# Patient Record
Sex: FEMALE | Race: WHITE | URBAN - METROPOLITAN AREA
[De-identification: names, ages, dates, MRNs, and addresses within clinical notes are randomized per-mention and may not be internally consistent; named-entity substitution may affect disease eponyms.]

---

## 2022-05-04 ENCOUNTER — OFFICE VISIT (OUTPATIENT)
Dept: URBAN - METROPOLITAN AREA CLINIC 44 | Facility: CLINIC | Age: 87
End: 2022-05-04
Payer: MEDICARE

## 2022-05-04 DIAGNOSIS — H40.1132 PRIMARY OPEN-ANGLE GLAUCOMA, BILATERAL, MODERATE STAGE: Primary | ICD-10-CM

## 2022-05-04 PROCEDURE — 92133 CPTRZD OPH DX IMG PST SGM ON: CPT | Performed by: OPHTHALMOLOGY

## 2022-05-04 PROCEDURE — 99205 OFFICE O/P NEW HI 60 MIN: CPT | Performed by: OPHTHALMOLOGY

## 2022-05-04 PROCEDURE — 92083 EXTENDED VISUAL FIELD XM: CPT | Performed by: OPHTHALMOLOGY

## 2022-05-04 PROCEDURE — 92250 FUNDUS PHOTOGRAPHY W/I&R: CPT | Performed by: OPHTHALMOLOGY

## 2022-05-04 PROCEDURE — 76514 ECHO EXAM OF EYE THICKNESS: CPT | Performed by: OPHTHALMOLOGY

## 2022-05-04 RX ORDER — DORZOLAMIDE HCL 20 MG/ML
2 % SOLUTION/ DROPS OPHTHALMIC
Qty: 15 | Refills: 3 | Status: ACTIVE
Start: 2022-05-04

## 2022-05-04 RX ORDER — OLOPATADINE HYDROCHLORIDE 1 MG/ML
0.1 % SOLUTION OPHTHALMIC
Qty: 15 | Refills: 3 | Status: ACTIVE
Start: 2022-05-04

## 2022-05-04 RX ORDER — TIMOLOL MALEATE 5 MG/ML
0.5 % SOLUTION/ DROPS OPHTHALMIC
Qty: 10 | Refills: 3 | Status: ACTIVE
Start: 2022-05-04

## 2022-05-04 RX ORDER — LATANOPROST 50 UG/ML
0.005 % SOLUTION OPHTHALMIC
Qty: 7.5 | Refills: 3 | Status: ACTIVE
Start: 2022-05-04

## 2022-05-04 ASSESSMENT — INTRAOCULAR PRESSURE
OS: 18
OD: 36

## 2022-05-04 NOTE — IMPRESSION/PLAN
Impression: Primary open-angle glaucoma, bilateral, moderate stage: O62.9959. Plan: PT HAS GLAUCOMA OD>OS     GONIO :   SS OU (05/04/2022)      PACHS:  
PT WAS REFERRED BY [facility] [facility] THE PT REPORTS DIFFICULTY WITH SHORT TERM MEMORY AND PRESENTS WITH HER GRANDAUGHTER
PT DENIES FAMILY HX OF GLAUCOMA
PT DENIES SULFA ALLERGY 
PT DENIES LUNG DZ 
TARGET IOP LOW TEENS OR LESS 
RECOMMEND : 
1. CONTINUE LATANAPROST OU QPM
2. RAISE TO  DORZOLAMIDE OD TID (PRESENTED USING QDAY, RAISE ON 5/4/22) 3. DISCUSSED EYEDROP TECHNIQUE WITH PT AND HER GRANDAUGHTER 4. ADD TIMOLOL 1/2 OD QAM (START 5/4/22) 5. THE PATIENT DEFERS ANY GLAUCOMA SURGERY TO THE RIGHT EYE 5/4/22 6.  PRESCRIBE PATADAY OD QDAY

## 2022-07-19 ENCOUNTER — OFFICE VISIT (OUTPATIENT)
Dept: URBAN - METROPOLITAN AREA CLINIC 44 | Facility: CLINIC | Age: 87
End: 2022-07-19
Payer: MEDICARE

## 2022-07-19 DIAGNOSIS — H43.813 VITREOUS DEGENERATION, BILATERAL: ICD-10-CM

## 2022-07-19 DIAGNOSIS — H40.1132 PRIMARY OPEN-ANGLE GLAUCOMA, BILATERAL, MODERATE STAGE: ICD-10-CM

## 2022-07-19 DIAGNOSIS — H04.123 TEAR FILM INSUFFICIENCY OF BILATERAL LACRIMAL GLANDS: Primary | ICD-10-CM

## 2022-07-19 DIAGNOSIS — H53.19 OTHER SUBJECTIVE VISUAL DISTURBANCES: ICD-10-CM

## 2022-07-19 DIAGNOSIS — Z96.1 PRESENCE OF INTRAOCULAR LENS: ICD-10-CM

## 2022-07-19 PROCEDURE — 99204 OFFICE O/P NEW MOD 45 MIN: CPT | Performed by: OPTOMETRIST

## 2022-07-19 PROCEDURE — 92134 CPTRZ OPH DX IMG PST SGM RTA: CPT | Performed by: OPTOMETRIST

## 2022-07-19 ASSESSMENT — VISUAL ACUITY
OS: 20/25
OD: 20/30

## 2022-07-19 ASSESSMENT — INTRAOCULAR PRESSURE
OS: 20
OD: 23

## 2022-07-19 ASSESSMENT — KERATOMETRY
OD: 44.88
OS: 44.88

## 2022-07-19 NOTE — IMPRESSION/PLAN
Impression: Primary open-angle glaucoma, bilateral, moderate stage: Y65.0051. Plan: IOP suboptimal.  Continue current meds. Follow up with Dr. Brandon Rosado next week as scheduled.

## 2022-07-19 NOTE — IMPRESSION/PLAN
Impression: Other subjective visual disturbances: H53.19. Right. Plan: Patient has noticed a line in right eye for the past two months. It is bothersome, but does not appear to affect visual acuity. MAC OCT wnl. Unclear what is causing symptoms. Recommend see Retina for further evaluation.

## 2022-07-27 ENCOUNTER — OFFICE VISIT (OUTPATIENT)
Dept: URBAN - METROPOLITAN AREA CLINIC 44 | Facility: CLINIC | Age: 87
End: 2022-07-27
Payer: MEDICARE

## 2022-07-27 DIAGNOSIS — H40.1132 PRIMARY OPEN-ANGLE GLAUCOMA, BILATERAL, MODERATE STAGE: Primary | ICD-10-CM

## 2022-07-27 PROCEDURE — 99213 OFFICE O/P EST LOW 20 MIN: CPT | Performed by: OPHTHALMOLOGY

## 2022-07-27 ASSESSMENT — INTRAOCULAR PRESSURE
OD: 25
OS: 25

## 2022-07-27 NOTE — IMPRESSION/PLAN
Impression: Primary open-angle glaucoma, bilateral, moderate stage: R00.4228. Plan: PT HAS GLAUCOMA OD>OS     GONIO :   SS OU (05/04/2022) PT WAS REFERRED BY [facility] - 
THE PT REPORTS DIFFICULTY WITH SHORT TERM MEMORY AND PRESENTS WITH HER GRANDAUGHTER
PT DENIES FAMILY HX OF GLAUCOMA
PT DENIES SULFA ALLERGY 
PT DENIES LUNG DZ 
TARGET IOP LOW TEENS OR LESS 
RECOMMEND : 
1. CONTINUE LATANAPROST OU QPM
2. CONTINUE DORZOLAMIDE OD TID (PRESENTED USING QDAY, RAISED ON 5/4/22) 3. CONTINUE TIMOLOL 1/2 OD QAM (STARTED 5/4/22) 4. DISCUSSED EYEDROP TECHNIQUE WITH PT AND HER GRANDAUGHTER
5.  THE PATIENT DEFERS ANY GLAUCOMA SURGERY TO THE RIGHT EYE 5/4/22 - 7/27/22
6  F/U WITH DR ANTONIO 3-6 MONTHS OR SOONER

## 2022-09-12 ENCOUNTER — OFFICE VISIT (OUTPATIENT)
Dept: URBAN - METROPOLITAN AREA CLINIC 44 | Facility: CLINIC | Age: 87
End: 2022-09-12
Payer: MEDICARE

## 2022-09-12 DIAGNOSIS — H43.393 OTHER VITREOUS OPACITIES, BILATERAL: ICD-10-CM

## 2022-09-12 DIAGNOSIS — H53.19 OTHER SUBJECTIVE VISUAL DISTURBANCES: Primary | ICD-10-CM

## 2022-09-12 PROCEDURE — 92134 CPTRZ OPH DX IMG PST SGM RTA: CPT | Performed by: OPHTHALMOLOGY

## 2022-09-12 PROCEDURE — 99214 OFFICE O/P EST MOD 30 MIN: CPT | Performed by: OPHTHALMOLOGY

## 2022-09-12 ASSESSMENT — INTRAOCULAR PRESSURE
OD: 23
OS: 19

## 2022-09-12 NOTE — IMPRESSION/PLAN
Impression: Other subjective visual disturbances: H53.19 Right. Plan: OD>>OS. no retinal etiology, symptoms of aster-field loss consistent with severe supping with inferior notch. recommend cont as scheduled with Dr Josue Padron RTC PRN retina

## 2023-01-25 ENCOUNTER — OFFICE VISIT (OUTPATIENT)
Dept: URBAN - METROPOLITAN AREA CLINIC 44 | Facility: CLINIC | Age: 88
End: 2023-01-25
Payer: MEDICARE

## 2023-01-25 DIAGNOSIS — H40.1132 PRIMARY OPEN-ANGLE GLAUCOMA, BILATERAL, MODERATE STAGE: Primary | ICD-10-CM

## 2023-01-25 PROCEDURE — 92133 CPTRZD OPH DX IMG PST SGM ON: CPT | Performed by: OPHTHALMOLOGY

## 2023-01-25 PROCEDURE — 92250 FUNDUS PHOTOGRAPHY W/I&R: CPT | Performed by: OPHTHALMOLOGY

## 2023-01-25 PROCEDURE — 99213 OFFICE O/P EST LOW 20 MIN: CPT | Performed by: OPHTHALMOLOGY

## 2023-01-25 RX ORDER — DORZOLAMIDE HCL 20 MG/ML
2 % SOLUTION/ DROPS OPHTHALMIC
Qty: 15 | Refills: 3 | Status: INACTIVE
Start: 2023-01-25 | End: 2023-01-25

## 2023-01-25 RX ORDER — LATANOPROST 50 UG/ML
0.005 % SOLUTION OPHTHALMIC
Qty: 7.5 | Refills: 3 | Status: ACTIVE
Start: 2023-01-25

## 2023-01-25 RX ORDER — DORZOLAMIDE HCL 20 MG/ML
2 % SOLUTION/ DROPS OPHTHALMIC
Qty: 15 | Refills: 3 | Status: ACTIVE
Start: 2023-01-25

## 2023-01-25 RX ORDER — TIMOLOL MALEATE 5 MG/ML
0.5 % SOLUTION/ DROPS OPHTHALMIC
Qty: 10 | Refills: 3 | Status: ACTIVE
Start: 2023-01-25

## 2023-01-25 ASSESSMENT — INTRAOCULAR PRESSURE
OD: 32
OS: 26

## 2023-01-25 NOTE — IMPRESSION/PLAN
Impression: Primary open-angle glaucoma, bilateral, moderate stage: S94.2102. Plan: PT HAS GLAUCOMA OD>OS     GONIO :   SS OU (05/04/2022) PT WAS REFERRED BY [facility] - 
THE RIGHT EYE IS THE POORER SEEEING EYE, THE LEFT IS THE BETTER SEEING EYE
THE PT REPORTS DIFFICULTY WITH SHORT TERM MEMORY AND PRESENTS WITH HER GRANDAUGHTER
PT DENIES SULFA ALLERGY 
PT DENIES LUNG DZ 
TARGET IOP LOW TEENS OR LESS 
RECOMMEND : 
1. CONTINUE LATANAPROST OU QPM
2. USE  DORZOLAMIDE OU TID 3. USE  TIMOLOL 1/2 O QAM 
4. REVIEWED  EYEDROP TECHNIQUE WITH PT AND HER GRANDAUTER 1/25/23 5. THE PATIENT DEFERS ANY GLAUCOMA SURGERY TO THE RIGHT EYE 5/4/22 - 1/25/23 6. 01/25/23 Photo Optos - OD: GOOD-CUPPING; OS GOOD-CUPPING, OCT - OD: YIXN-12: INFERIOR, TEMP THINNING; OS: GOOD-86: WNL, 05/04/22 PACHY, VFT 7. F/U WITH DR ANTONIO 3-6 MONTHS OR SOONER FOR CONSIDERATION OF SLT OU (PT DEFERS OD)

## 2023-05-12 ENCOUNTER — OFFICE VISIT (OUTPATIENT)
Dept: URBAN - METROPOLITAN AREA CLINIC 44 | Facility: CLINIC | Age: 88
End: 2023-05-12
Payer: MEDICARE

## 2023-05-12 DIAGNOSIS — H40.1132 PRIMARY OPEN-ANGLE GLAUCOMA, BILATERAL, MODERATE STAGE: Primary | ICD-10-CM

## 2023-05-12 PROCEDURE — 92083 EXTENDED VISUAL FIELD XM: CPT | Performed by: OPHTHALMOLOGY

## 2023-05-12 PROCEDURE — 99213 OFFICE O/P EST LOW 20 MIN: CPT | Performed by: OPHTHALMOLOGY

## 2023-05-12 RX ORDER — LATANOPROST 50 UG/ML
0.005 % SOLUTION OPHTHALMIC
Qty: 7.5 | Refills: 3 | Status: ACTIVE
Start: 2023-05-12

## 2023-05-12 RX ORDER — DORZOLAMIDE HCL 20 MG/ML
2 % SOLUTION/ DROPS OPHTHALMIC
Qty: 15 | Refills: 3 | Status: ACTIVE
Start: 2023-05-12

## 2023-05-12 RX ORDER — TIMOLOL MALEATE 5 MG/ML
0.5 % SOLUTION/ DROPS OPHTHALMIC
Qty: 15 | Refills: 3 | Status: ACTIVE
Start: 2023-05-12

## 2023-05-12 ASSESSMENT — INTRAOCULAR PRESSURE
OD: 34
OS: 20

## 2023-05-12 NOTE — IMPRESSION/PLAN
Impression: Primary open-angle glaucoma, bilateral, moderate stage: F51.1034. Plan: PT HAS GLAUCOMA OD>OS     GONIO :   SS OU (05/04/2022) PT WAS REFERRED BY [facility] - 
THE RIGHT EYE IS THE POORER SEEEING EYE, THE LEFT IS THE BETTER SEEING EYE
THE PT REPORTS DIFFICULTY WITH SHORT TERM MEMORY 
PT DENIES SULFA ALLERGY 
PT DENIES LUNG DZ 
TARGET IOP LOW TEENS OR LESS 
RECOMMEND : 
1. CONTINUE LATANAPROST OU QPM
2. CONTINUE DORZOLAMIDE OU TID (STARTED 01/25/23) 3. CONTINUE  TIMOLOL 1/2 OU QAM (STARTED 01/25/23) 4. REVIEWED  [facility]CHNIQUE [facility] 5. THE PATIENT DEFERS ANY GLAUCOMA SURGERY TO THE RIGHT EYE 5/4/22 - 5/12/23 OR THE LEFT EYE 5/12/23 6. VF 05/12/23, OPTOS 01/25/23, RNFL 01/25/23 7.  F/U WITH DR ANTONIO 6-9 MONTHS OR SOONER FOR CONSIDERATION OF SLT OU (PT DEFERS OD)

## 2024-02-02 ENCOUNTER — OFFICE VISIT (OUTPATIENT)
Dept: URBAN - METROPOLITAN AREA CLINIC 44 | Facility: CLINIC | Age: 89
End: 2024-02-02
Payer: MEDICARE

## 2024-02-02 DIAGNOSIS — H40.1132 PRIMARY OPEN-ANGLE GLAUCOMA, BILATERAL, MODERATE STAGE: Primary | ICD-10-CM

## 2024-02-02 PROCEDURE — 92133 CPTRZD OPH DX IMG PST SGM ON: CPT | Performed by: OPHTHALMOLOGY

## 2024-02-02 PROCEDURE — 99213 OFFICE O/P EST LOW 20 MIN: CPT | Performed by: OPHTHALMOLOGY

## 2024-02-02 ASSESSMENT — INTRAOCULAR PRESSURE
OD: 50
OS: 21

## 2025-08-06 ENCOUNTER — OFFICE VISIT (OUTPATIENT)
Dept: URBAN - METROPOLITAN AREA CLINIC 44 | Facility: CLINIC | Age: 89
End: 2025-08-06
Payer: MEDICARE

## 2025-08-06 DIAGNOSIS — H40.1132 PRIMARY OPEN-ANGLE GLAUCOMA, BILATERAL, MODERATE STAGE: Primary | ICD-10-CM

## 2025-08-06 PROCEDURE — 99214 OFFICE O/P EST MOD 30 MIN: CPT | Performed by: OPHTHALMOLOGY

## 2025-08-06 PROCEDURE — 92083 EXTENDED VISUAL FIELD XM: CPT | Performed by: OPHTHALMOLOGY

## 2025-08-06 RX ORDER — DORZOLAMIDE HCL 20 MG/ML
2 % SOLUTION/ DROPS OPHTHALMIC
Qty: 10 | Refills: 3 | Status: ACTIVE
Start: 2025-08-06

## 2025-08-06 RX ORDER — LATANOPROST 50 UG/ML
0.005 % SOLUTION/ DROPS OPHTHALMIC
Qty: 7.5 | Refills: 3 | Status: ACTIVE
Start: 2025-08-06

## 2025-08-06 RX ORDER — LATANOPROST 50 UG/ML
0.005 % SOLUTION/ DROPS OPHTHALMIC
Qty: 9 | Refills: 2 | Status: INACTIVE
Start: 2025-08-06 | End: 2025-08-06

## 2025-08-06 RX ORDER — DORZOLAMIDE HYDROCHLORIDE AND TIMOLOL MALEATE 20; 5 MG/ML; MG/ML
SOLUTION/ DROPS OPHTHALMIC
Qty: 15 | Refills: 3 | Status: ACTIVE
Start: 2025-08-06

## 2025-08-06 ASSESSMENT — INTRAOCULAR PRESSURE
OS: 17
OD: 56